# Patient Record
Sex: MALE | Race: BLACK OR AFRICAN AMERICAN | NOT HISPANIC OR LATINO | Employment: FULL TIME | URBAN - METROPOLITAN AREA
[De-identification: names, ages, dates, MRNs, and addresses within clinical notes are randomized per-mention and may not be internally consistent; named-entity substitution may affect disease eponyms.]

---

## 2023-10-31 ENCOUNTER — EVALUATION (OUTPATIENT)
Dept: PHYSICAL THERAPY | Facility: CLINIC | Age: 58
End: 2023-10-31
Payer: COMMERCIAL

## 2023-10-31 DIAGNOSIS — G89.29 CHRONIC LEFT SHOULDER PAIN: Primary | ICD-10-CM

## 2023-10-31 DIAGNOSIS — M25.512 CHRONIC LEFT SHOULDER PAIN: Primary | ICD-10-CM

## 2023-10-31 PROCEDURE — 97161 PT EVAL LOW COMPLEX 20 MIN: CPT | Mod: GP | Performed by: PHYSICAL THERAPIST

## 2023-10-31 PROCEDURE — 97110 THERAPEUTIC EXERCISES: CPT | Mod: GP | Performed by: PHYSICAL THERAPIST

## 2023-10-31 RX ORDER — ATORVASTATIN CALCIUM 80 MG/1
80 TABLET, FILM COATED ORAL DAILY
COMMUNITY
Start: 2023-07-19

## 2023-10-31 RX ORDER — MELOXICAM 7.5 MG/1
15 TABLET ORAL DAILY PRN
COMMUNITY
Start: 2023-07-05 | End: 2024-02-06 | Stop reason: ALTCHOICE

## 2023-10-31 RX ORDER — ATORVASTATIN CALCIUM 40 MG/1
40 TABLET, FILM COATED ORAL DAILY
COMMUNITY
Start: 2023-04-17 | End: 2024-02-06 | Stop reason: ALTCHOICE

## 2023-10-31 ASSESSMENT — ENCOUNTER SYMPTOMS
LOSS OF SENSATION IN FEET: 0
OCCASIONAL FEELINGS OF UNSTEADINESS: 0
DEPRESSION: 0

## 2023-10-31 NOTE — Clinical Note
October 31, 2023     Patient: Kevon Quijano   YOB: 1965   Date of Visit: 10/31/2023       To Whom it May Concern:    Kevon Quijano was seen in my clinic on 10/31/2023. He {Return to school/sport:01209}.    If you have any questions or concerns, please don't hesitate to call.         Sincerely,          Weston Dawson, PT        CC: No Recipients

## 2023-10-31 NOTE — PROGRESS NOTES
"Physical Therapy Evaluation    Patient Name: Kevon Quijano  MRN: [id number]  Today's Date: 10/31/2023  Visit: 1  Referred by: Dr. Cervantes  Diagnosis: L) shoulder pain    SUBJECTIVE:  58 y.o. English speaking male with c/o L) shoulder pain x 6 months.  Insidious onset of symptoms.  Symptoms anterior and superior aspects of L) shoulder.   Worse: lifting with arm extended away from body.  Moving arm above shoulder height or to extremes of motions.  Laying on that shoulder  Better: activity modification  Pain:  Superior and anterior L) shoulder pain 0-6/10.    Prior level of function:  No limitations    OBJECTIVE:  End range pain with ER and HBB-IR L)  Painful arc with L) shoulder ABD > flexion.  TTP over anterior and superior L) ACJ  (+) Carlos and Neer impingement  Painful and weak 4/5 resisted ER>ABD L) shoulder  4/5 mid and lower trap strength  (+) pain with ACJ shear test    Outcome Measure:  SPADI-  32%    ASSESSMENT:  Pt. 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  He has limited painful ROM, decreased painful resisted movements of shoulder which limit his functional abilities with the UE.  He requires PT to address these issues to improve his symptoms and functional tolerances.    TREATMENT:  - Therex:  Cross body S 10\" x 10  Prone scapular retract I's x 5\" x 10  Isometric ABD- 30\" x 3  Isometric ER- 30\" x 3  Doorway Pectoral stretch Low  15\" x 5    PATIENT EDUCATION:  HEP    PLAN:   Daily Hep 2x/day  Weekly PT x 8 weeks.  Pain is to improve pain free AROM and strength of shoulder to allow improved function.  Rehab potential: good  Plan of care agreement: Y    GOALS:  Active       PT Problem       Pt. will have improved score on SPADI by at least 15% indicative of improved function       Start:  10/31/23    Expected End:  12/29/23            Pt. will have improved pain free AROM of UE to assist with improving functional use of UE       Start:  10/31/23    Expected End:  12/29/23    "         Pt. will have improved UE strength to assist with improving functional use of UE       Start:  10/31/23    Expected End:  12/29/23            PT Goal 4       Start:  10/31/23    Expected End:  12/29/23       Pt. Will be independent with HEP         Pt. will be able to use UE with all ADLs       Start:  10/31/23    Expected End:  12/29/23            Pt. will report decreased pain by at least 2 levels using a VAS       Start:  10/31/23    Expected End:  12/29/23

## 2023-11-06 ENCOUNTER — TREATMENT (OUTPATIENT)
Dept: PHYSICAL THERAPY | Facility: CLINIC | Age: 58
End: 2023-11-06
Payer: COMMERCIAL

## 2023-11-06 DIAGNOSIS — G89.29 CHRONIC LEFT SHOULDER PAIN: Primary | ICD-10-CM

## 2023-11-06 DIAGNOSIS — M25.512 CHRONIC LEFT SHOULDER PAIN: Primary | ICD-10-CM

## 2023-11-06 PROCEDURE — 97110 THERAPEUTIC EXERCISES: CPT | Mod: GP,CQ | Performed by: PHYSICAL THERAPY ASSISTANT

## 2023-11-06 PROCEDURE — 97140 MANUAL THERAPY 1/> REGIONS: CPT | Mod: GP,CQ | Performed by: PHYSICAL THERAPY ASSISTANT

## 2023-11-06 NOTE — PROGRESS NOTES
"Physical Therapy Treatment    Patient Name: Kevon Quijano  MRN: [id number]  Today's Date: 11/6/2023  Visit# 2/4  10/31/23-12/29/23  Diagnosis:   1. Chronic left shoulder pain  Follow Up In Physical Therapy           PRECAUTIONS:      SUBJECTIVE:  Pt reports 4/10 L shld pain entering therapy today.     OBJECTIVE:  Intermittent pain with there ex, more specifically external rot alia. Modified the movement which voided symptoms.     Access Code: [email or web address]  URL: [email or web address]  Date: 11/06/2023  Prepared by: Reece Raphael    Exercises  - Standing Row with Anchored Resistance Band with PLB  - 1 x daily - 7 x weekly - 2-3 sets - 10 reps  - Shoulder extension with resistance - Neutral  - 1 x daily - 7 x weekly - 2-3 sets - 10 reps  - Sidelying Shoulder ER with Towel and Dumbbell  - 1 x daily - 7 x weekly - 2-3 sets - 10 reps  - Single Arm Serratus Punches in Supine with Dumbbell  - 1 x daily - 7 x weekly - 2-3 sets - 10 reps    TREATMENT:  - Therex:   UBE 2'/2'  Overhead pulley 3x10  Rows Blk band 2x10  Shld ext GTB 2x10  6-way shld alia 5\"-10\"x10ea  Supine ceiling punch 1# 2x10  S/L shld ER 1# 2x10      - Manual Therapy:   L shld PROM, x8'    - Neuromuscular Re-education:        - Gait Train:       - Modalities:           ASSESSMENT:   Overall responded well to Rx. Symptoms remains minimal but manageable with session. Added low impact band resisted exercises for HEP. Encouraged cont HEP.     PLAN:    Cont with progressive strengthen, mobility and function as saran.        "

## 2023-11-16 ENCOUNTER — TREATMENT (OUTPATIENT)
Dept: PHYSICAL THERAPY | Facility: CLINIC | Age: 58
End: 2023-11-16
Payer: COMMERCIAL

## 2023-11-16 DIAGNOSIS — G89.29 CHRONIC LEFT SHOULDER PAIN: ICD-10-CM

## 2023-11-16 DIAGNOSIS — M25.512 CHRONIC LEFT SHOULDER PAIN: ICD-10-CM

## 2023-11-16 PROCEDURE — 97110 THERAPEUTIC EXERCISES: CPT | Mod: GP | Performed by: PHYSICAL THERAPIST

## 2023-11-16 PROCEDURE — 97140 MANUAL THERAPY 1/> REGIONS: CPT | Mod: GP | Performed by: PHYSICAL THERAPIST

## 2023-11-16 NOTE — PROGRESS NOTES
"Physical Therapy Evaluation    Patient Name: Kevon Quijano  MRN: [id number]  Today's Date: 11/16/2023  Visit: 1  Referred by: Dr. Cervantes  Diagnosis: L) shoulder pain    SUBJECTIVE:  58 y.o. male who follows up for L) shoulder pain.     He states that his symptoms are improved.  Symptoms anterior and superior aspects of L) shoulder.   HEP: Y  Pain:  Superior and anterior L) shoulder pain 0-4/10.    Prior level of function:  No limitations    OBJECTIVE:  End range pain with ER and HBB-IR L)  End range pain with ABD and Flexion today.  Painful and weak 4/5 resisted ER>ABD L) shoulder  Minimal ache with Carlos impingement L) today.    Outcome Measure:  SPADI-  32%    ASSESSMENT:  Pt. With improved pain free ROM and reports improved pain free periods.  Tolerated today's session well.    TREATMENT:  - Therex:  UBE x 5 min. L5  Pulley elevation 2 x 15  Cross body S 10\" x 10  Doorway Pectoral stretch Low  30\" x 3  Prone scapular retract I's x 5\" x 10  1#  - Standing Row with Tband BLK  3 sets - 10 reps  - Shoulder extension 2#   3 sets - 10 reps  SL shoulder ABD  2#  3x10  - Sidelying Shoulder ER  2# 3 sets - 10 reps  - Supine Serratus Punches   2# 3 sets - 10 reps    Manual- inferior and posterior glide mobilization L) GHJ in 90* ABD    PATIENT EDUCATION:  HEP    PLAN:   Continue daily Hep   Continue to improving shoulder pain free mobility and cuff/scapular strength.  Rehab potential: good  Plan of care agreement: Y    GOALS:  Active       PT Problem       Pt. will have improved score on SPADI by at least 15% indicative of improved function       Start:  10/31/23    Expected End:  12/29/23            Pt. will have improved pain free AROM of UE to assist with improving functional use of UE       Start:  10/31/23    Expected End:  12/29/23            Pt. will have improved UE strength to assist with improving functional use of UE       Start:  10/31/23    Expected End:  12/29/23            PT Goal 4       Start:  " 10/31/23    Expected End:  12/29/23       Pt. Will be independent with HEP         Pt. will be able to use UE with all ADLs       Start:  10/31/23    Expected End:  12/29/23            Pt. will report decreased pain by at least 2 levels using a VAS       Start:  10/31/23    Expected End:  12/29/23

## 2023-12-05 ENCOUNTER — APPOINTMENT (OUTPATIENT)
Dept: RADIOLOGY | Facility: HOSPITAL | Age: 58
End: 2023-12-05
Payer: COMMERCIAL

## 2023-12-05 ENCOUNTER — HOSPITAL ENCOUNTER (EMERGENCY)
Facility: HOSPITAL | Age: 58
Discharge: HOME | End: 2023-12-05
Attending: EMERGENCY MEDICINE
Payer: COMMERCIAL

## 2023-12-05 VITALS
DIASTOLIC BLOOD PRESSURE: 78 MMHG | RESPIRATION RATE: 18 BRPM | SYSTOLIC BLOOD PRESSURE: 117 MMHG | OXYGEN SATURATION: 98 % | TEMPERATURE: 97.2 F | HEART RATE: 85 BPM

## 2023-12-05 DIAGNOSIS — M25.522 LEFT ELBOW PAIN: Primary | ICD-10-CM

## 2023-12-05 PROCEDURE — 99283 EMERGENCY DEPT VISIT LOW MDM: CPT | Mod: 25 | Performed by: EMERGENCY MEDICINE

## 2023-12-05 PROCEDURE — 73080 X-RAY EXAM OF ELBOW: CPT | Mod: LT,FY

## 2023-12-05 PROCEDURE — 73080 X-RAY EXAM OF ELBOW: CPT | Mod: LEFT SIDE | Performed by: RADIOLOGY

## 2023-12-05 RX ORDER — ACETAMINOPHEN 325 MG/1
975 TABLET ORAL ONCE
Status: DISCONTINUED | OUTPATIENT
Start: 2023-12-05 | End: 2023-12-05 | Stop reason: HOSPADM

## 2023-12-05 ASSESSMENT — COLUMBIA-SUICIDE SEVERITY RATING SCALE - C-SSRS
1. IN THE PAST MONTH, HAVE YOU WISHED YOU WERE DEAD OR WISHED YOU COULD GO TO SLEEP AND NOT WAKE UP?: NO
2. HAVE YOU ACTUALLY HAD ANY THOUGHTS OF KILLING YOURSELF?: NO
6. HAVE YOU EVER DONE ANYTHING, STARTED TO DO ANYTHING, OR PREPARED TO DO ANYTHING TO END YOUR LIFE?: NO

## 2023-12-05 NOTE — Clinical Note
Kevon Quijano was seen and treated in our emergency department on 12/5/2023.  He may return to work on 12/07/2023.       If you have any questions or concerns, please don't hesitate to call.      Duarte Stack PA-C

## 2023-12-05 NOTE — ED PROVIDER NOTES
HPI   Chief Complaint   Patient presents with    Elbow Pain       58-year-old male with past medical history of implantable cardiac defibrillator due to sinus bradycardia presents the ED with a chief concern of left elbow pain.  Patient states symptoms started this morning when he woke up.  States that he woke up this morning without any pain however when he looked in the mirror he noticed some swelling to his left elbow.  He has never noticed this before.  He states that once he noticed that he began to feel pain in the area.  Denies any fever/chills, nausea/vomiting.  Denies any numbness or weakness or tingling.  States he has chronic pain in the shoulder due to previous surgeries but denies any changes with this shoulder pain.  Denies any pain in the wrist.  He has never had similar symptoms in the past.  The right elbow is unremarkable.  He is an  and sits at a desk all day with his elbows on the desk.  No other symptoms or concerns at this time.  Patient denies any history of IV drug use.  He is not anticoagulated.      History provided by:  Patient   used: No                        [place] Coma Scale Score: 15                  Patient History   No past medical history on file.  No past surgical history on file.  No family history on file.  Social History     Tobacco Use    Smoking status: Not on file    Smokeless tobacco: Not on file   Substance Use Topics    Alcohol use: Not on file    Drug use: Not on file       Physical Exam   ED Triage Vitals [12/05/23 0807]   Temp Heart Rate Resp BP   36.2 °C (97.2 °F) 85 18 117/78      SpO2 Temp src Heart Rate Source Patient Position   98 % -- -- --      BP Location FiO2 (%)     -- --       Physical Exam  Constitutional: Vital signs per nursing notes.  Well developed, well nourished.  No acute distress.    Psychiatric: alert and oriented to person, place, and time; no abnormalities of mood or affect; memory intact  Eyes:  conjunctivae and  lids normal  ENT: Ears normal externally; face symmetric. voice normal  Neck: neck supple, no meningismus; trachea midline without deviation  Respiratory: normal respiratory effort and excursion; no rales, rhonchi, or wheezes; equal air entry  Cardiovascular: RRR, 2+ pulses extremities   Neurological: normal speech; CN II-XII grossly intact, normal motor and sensory function; no nystagmus; ; no ataxia.  GI: no distention, soft, nontender  : Deferred  Musculoskeletal: normal gait and station; normal digits and nails; swelling noted over left olecranon.  No induration or redness noted over left elbow.  Full range of motion of left elbow without any focal bony tenderness palpation.  Tenderness over the olecranon bursa.  Left shoulder and wrist are unremarkable.  2+ symmetric radial pulses bilaterally.  5/5 strength in upper extremities bilaterally.  Sensation intact in upper extremities bilaterally.  Neurovascular status intact.  Compartments are soft.  No cyanosis.    Skin: normal to inspection; normal to palpation; no rash    ED Course & MDM   ED Course as of 12/05/23 1049   Tue Dec 05, 2023   0920 IMPRESSION:  No acute fracture or dislocation.      Degenerative changes [MC]      ED Course User Index  [MC] Duarte Stack PA-C         Diagnoses as of 12/05/23 1049   Left elbow pain       Medical Decision Making  58-year-old male with past medical history of implantable cardiac defibrillator due to sinus bradycardia presents the ED with a chief concern of left elbow pain.  Vital signs are reassuring.  Patient overall appears well and is nontoxic-appearing.  Patient was given Tylenol in the ED. x-ray showed no acute fracture or dislocation.  Degenerative changes noted on left elbow.  The joint is not erythematous or warm.  There is no induration.  I am not concerned for any cellulitis, lymphangitis, septic arthritis, or crystal arthropathy at this time.  I do not think further testing with blood work or arthrocentesis  is indicated at this time.  Low suspicion for any radiculopathy at this time.  Elbow has notable greater trochanteric bursitis.  Consistent with a history as patient is an  and leans on his elbows as described.  He has no systemic signs or symptoms.  He is able to move the joint and is no pain with passive range of motion.  X-ray shows no fracture.  2+ symmetric radial pulses bilaterally.  No concern for any vascular pathology at this time.  No systemic signs or symptoms.  Patient is not immunocompromised.  Patient was placed in elbow Ace wrap in the ED.  Discussed my impression and findings with patient and he feels comfortable returning home.  We discussed very strict return precautions including returning for any new or worsening signs or symptoms.  Patient is in agreement this plan.  He will follow-up with orthopedics within 2 weeks.  Again discussed strict return precautions.    Differential diagnosis: Infected versus noninfected olecranon bursitis, gout, septic arthritis, crystal arthropathy, fracture, strain, sprain, ligamentous injury, cervical radiculopathy    Disposition/treatment  1.  Left elbow pain    Shared decision-making was used patient feels comfortable returning home     Patient was advised to follow up with recommended provider in 1 day1 for another evaluation and exam. I advised patient/guardian to return or go to closest emergency room immediately if symptoms change, get worse, new symptoms develop prior to follow up. If there is no improvement in symptoms in the next 24 hours they are advised to return for further evaluation and exam. I also explained the plan and treatment course. Patient/guardian is in agreement with plan, treatment course, and follow up and states verbally that they will comply.    Homegoing. I discussed the differential; results and discharge plan with the patient and/or family/friend/caregiver if present.  I emphasized the importance of follow-up with the  physician I referred them to in the timeframe recommended.  I explained reasons for the patient to return to the Emergency Department. They agreed that if they feel their condition is worsening or if they have any other concern they should call 911 immediately for further assistance. I gave the patient an opportunity to ask all questions they had and answered all of them accordingly. They understand return precautions and discharge instructions. The patient and/or family/friend/caregiver expressed understanding verbally and that they would comply.        This note has been transcribed using voice recognition and may contain grammatical errors, misplaced words, incorrect words, incorrect phrases or other errors.            Procedure  Procedures     Duarte Stack PA-C  12/05/23 1052

## 2023-12-28 ENCOUNTER — OFFICE VISIT (OUTPATIENT)
Dept: ORTHOPEDIC SURGERY | Facility: HOSPITAL | Age: 58
End: 2023-12-28
Payer: COMMERCIAL

## 2023-12-28 DIAGNOSIS — M70.22 OLECRANON BURSITIS OF LEFT ELBOW: Primary | ICD-10-CM

## 2023-12-28 PROCEDURE — 99213 OFFICE O/P EST LOW 20 MIN: CPT | Performed by: FAMILY MEDICINE

## 2023-12-28 PROCEDURE — 99203 OFFICE O/P NEW LOW 30 MIN: CPT | Performed by: FAMILY MEDICINE

## 2023-12-28 ASSESSMENT — PAIN SCALES - GENERAL: PAINLEVEL_OUTOF10: 0 - NO PAIN

## 2023-12-28 ASSESSMENT — PAIN - FUNCTIONAL ASSESSMENT: PAIN_FUNCTIONAL_ASSESSMENT: 0-10

## 2023-12-28 NOTE — PROGRESS NOTES
** Please excuse any errors in grammar or translation related to this dictation. Voice recognition software was utilized to prepare this document. **    Assessment & Plan:  Patient's elbow complaint most consistent with olecranon bursitis.  No concern for septic cause.  Condition has vastly improved since its onset with the use of compression sleeve and use of ibuprofen.  Discussed with patient that many times this condition is caused by repetitive microtrauma with his elbow rubbing on a hard surface.  Recommend purchasing cushion pad for when he is working at his desk to minimize this.  As the swelling is minimal at the present time advised against aspiration which he agrees with.  Did discuss with patient that this condition may recur and if it does at that time could consider aspiration +/- cortisone injection.  All questions answered and patient responded care.      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  Onset of this injury was on month ago.  No mechanism of injury. There is swelling.  It has decreased  since initial.  Following onset patient was evaluated at ER on 12/5/23, had xrays completed.  Since onset, symptoms have improved.  The injury is aggravated by bumping it.  He has been wearing a compression sleeve and taking motrin as needed.  Patient presents today for evaluation and further orthopedic management.  Denies previous surgery to this site.     Exam:  LEFT Elbow Exam:  Mild swelling over the olecranon process.  No erythema, ecchymosis or warmth.  Non-TTP over radial head, olecranon, medial epicondyle, lateral epicondyle  Flexion to 150 of 150deg, Extension to 0 of 0deg, Pronate/Supinate 80/80 of 80/80deg  5/5 strength elbow flexion and extension, wrist extension and flexion, finger spread  SILT    Results:  X-rays of left elbow obtained 12/5/2023 reviewed and independent  interpreted as no acute fracture.  Mild degenerative changes present.  Reviewed ER note dated 12/5/2023.

## 2024-01-26 DIAGNOSIS — M20.42 HAMMER TOE OF LEFT FOOT: Primary | ICD-10-CM

## 2024-01-26 RX ORDER — CEFAZOLIN SODIUM 2 G/100ML
2 INJECTION, SOLUTION INTRAVENOUS ONCE
Status: CANCELLED | OUTPATIENT
Start: 2024-03-01 | End: 2024-01-26

## 2024-01-26 RX ORDER — SODIUM CHLORIDE, SODIUM LACTATE, POTASSIUM CHLORIDE, CALCIUM CHLORIDE 600; 310; 30; 20 MG/100ML; MG/100ML; MG/100ML; MG/100ML
100 INJECTION, SOLUTION INTRAVENOUS CONTINUOUS
Status: CANCELLED | OUTPATIENT
Start: 2024-03-01

## 2024-02-06 ENCOUNTER — PRE-ADMISSION TESTING (OUTPATIENT)
Dept: PREADMISSION TESTING | Facility: HOSPITAL | Age: 59
End: 2024-02-06
Payer: COMMERCIAL

## 2024-02-06 VITALS
OXYGEN SATURATION: 96 % | DIASTOLIC BLOOD PRESSURE: 72 MMHG | WEIGHT: 240.08 LBS | BODY MASS INDEX: 30.81 KG/M2 | HEART RATE: 70 BPM | RESPIRATION RATE: 18 BRPM | SYSTOLIC BLOOD PRESSURE: 116 MMHG | TEMPERATURE: 96.4 F | HEIGHT: 74 IN

## 2024-02-06 DIAGNOSIS — M20.42 HAMMER TOE OF LEFT FOOT: ICD-10-CM

## 2024-02-06 LAB
ALBUMIN SERPL BCP-MCNC: 4.5 G/DL (ref 3.4–5)
ALP SERPL-CCNC: 108 U/L (ref 33–120)
ALT SERPL W P-5'-P-CCNC: 23 U/L (ref 10–52)
AMPHETAMINES UR QL SCN: ABNORMAL
ANION GAP SERPL CALC-SCNC: 11 MMOL/L (ref 10–20)
AST SERPL W P-5'-P-CCNC: 17 U/L (ref 9–39)
BARBITURATES UR QL SCN: ABNORMAL
BENZODIAZ UR QL SCN: ABNORMAL
BILIRUB SERPL-MCNC: 0.7 MG/DL (ref 0–1.2)
BUN SERPL-MCNC: 14 MG/DL (ref 6–23)
BZE UR QL SCN: ABNORMAL
CALCIUM SERPL-MCNC: 9.6 MG/DL (ref 8.6–10.3)
CANNABINOIDS UR QL SCN: ABNORMAL
CHLORIDE SERPL-SCNC: 108 MMOL/L (ref 98–107)
CO2 SERPL-SCNC: 25 MMOL/L (ref 21–32)
CREAT SERPL-MCNC: 1.25 MG/DL (ref 0.5–1.3)
EGFRCR SERPLBLD CKD-EPI 2021: 67 ML/MIN/1.73M*2
ERYTHROCYTE [DISTWIDTH] IN BLOOD BY AUTOMATED COUNT: 13.2 % (ref 11.5–14.5)
FENTANYL+NORFENTANYL UR QL SCN: ABNORMAL
GLUCOSE SERPL-MCNC: 108 MG/DL (ref 74–99)
HCT VFR BLD AUTO: 44.3 % (ref 41–52)
HGB BLD-MCNC: 15.1 G/DL (ref 13.5–17.5)
MCH RBC QN AUTO: 30.5 PG (ref 26–34)
MCHC RBC AUTO-ENTMCNC: 34.1 G/DL (ref 32–36)
MCV RBC AUTO: 90 FL (ref 80–100)
NRBC BLD-RTO: 0 /100 WBCS (ref 0–0)
OPIATES UR QL SCN: ABNORMAL
OXYCODONE+OXYMORPHONE UR QL SCN: ABNORMAL
PCP UR QL SCN: ABNORMAL
PLATELET # BLD AUTO: 264 X10*3/UL (ref 150–450)
POTASSIUM SERPL-SCNC: 4.2 MMOL/L (ref 3.5–5.3)
PROT SERPL-MCNC: 6.9 G/DL (ref 6.4–8.2)
RBC # BLD AUTO: 4.95 X10*6/UL (ref 4.5–5.9)
SODIUM SERPL-SCNC: 140 MMOL/L (ref 136–145)
WBC # BLD AUTO: 9.3 X10*3/UL (ref 4.4–11.3)

## 2024-02-06 PROCEDURE — 84075 ASSAY ALKALINE PHOSPHATASE: CPT

## 2024-02-06 PROCEDURE — 80307 DRUG TEST PRSMV CHEM ANLYZR: CPT

## 2024-02-06 PROCEDURE — 99203 OFFICE O/P NEW LOW 30 MIN: CPT | Performed by: NURSE PRACTITIONER

## 2024-02-06 PROCEDURE — 85027 COMPLETE CBC AUTOMATED: CPT

## 2024-02-06 PROCEDURE — 93005 ELECTROCARDIOGRAM TRACING: CPT

## 2024-02-06 PROCEDURE — 36415 COLL VENOUS BLD VENIPUNCTURE: CPT

## 2024-02-06 ASSESSMENT — DUKE ACTIVITY SCORE INDEX (DASI)
DASI METS SCORE: 9.9
CAN YOU TAKE CARE OF YOURSELF (EAT, DRESS, BATHE, OR USE TOILET): YES
CAN YOU CLIMB A FLIGHT OF STAIRS OR WALK UP A HILL: YES
CAN YOU DO LIGHT WORK AROUND THE HOUSE LIKE DUSTING OR WASHING DISHES: YES
CAN YOU DO MODERATE WORK AROUND THE HOUSE LIKE VACUUMING, SWEEPING FLOORS OR CARRYING GROCERIES: YES
CAN YOU DO YARD WORK LIKE RAKING LEAVES, WEEDING OR PUSHING A MOWER: YES
CAN YOU HAVE SEXUAL RELATIONS: YES
TOTAL_SCORE: 58.2
CAN YOU PARTICIPATE IN MODERATE RECREATIONAL ACTIVITIES LIKE GOLF, BOWLING, DANCING, DOUBLES TENNIS OR THROWING A BASEBALL OR FOOTBALL: YES
CAN YOU PARTICIPATE IN STRENOUS SPORTS LIKE SWIMMING, SINGLES TENNIS, FOOTBALL, BASKETBALL, OR SKIING: YES
CAN YOU WALK A BLOCK OR TWO ON LEVEL GROUND: YES
CAN YOU RUN A SHORT DISTANCE: YES
CAN YOU WALK INDOORS, SUCH AS AROUND YOUR HOUSE: YES
CAN YOU DO HEAVY WORK AROUND THE HOUSE LIKE SCRUBBING FLOORS OR LIFTING AND MOVING HEAVY FURNITURE: YES

## 2024-02-06 NOTE — PREPROCEDURE INSTRUCTIONS
Medication List            Accurate as of February 6, 2024  9:52 AM. Always use your most recent med list.                atorvastatin 80 mg tablet  Commonly known as: Lipitor  Medication Adjustments for Surgery: Take morning of surgery with sip of water, no other fluids                   PRE-OPERATIVE INSTRUCTIONS FOR SURGERY    *Do not eat anything after midnight the night of surgery.  This includes food of any kind (including hard candy, cough drops, mints and gum) and beverages (including coffee and soda).  You may drink up to one 8 ounce glass of water up until three hours before your scheduled surgery time.    *One of our staff members will call you ONE business day before your surgery, between 11a-2p  To let you know the time to arrive.  If you have no received a call by 2 pm, call [phone]  *When you arrive at the hospital-->GO TO Registration on the ground floor  *Stop smoking 24 hours prior to surgery.  No Marijuana, CBD Oil or Vaping for 48 hours  *No alcohol 24 hours prior to surgery  *You will need a responsible adult to drive you home  -No acrylic nails or nail polish on at least one fingernail, NO polish on toes for foot surgery  -You may be asked to remove your dentures, partial plate, eyeglasses or contact lenses before going to surgery.  Please bring a case for these items.  -Body piercings need to be removed.  Jewelry and valuables should be left at home.  -Put on loose,  comfortable, clean clothing, that will accommodate bandages    HOME PREOPERATIVE ANTIBACTERIAL SHOWER:  -This shower is a way of cleaning the skin with germ killing solution before surgery.  The solution contains Chorhexidine (CHG).   Let your Dr. Know if you are allergic to Chlorhexidine.    NIGHT BEFORE SURGERY:      -Take a normal shower and wash your hair  -Turn OFF water to avoid rinsing the antibacterial skin cleanser off (CHG).  -Apply the CHG cleanser to a clean and wet washcloth.  Lather your entire body from the  neck down.    -DO NOT USE ON THE HEAD, FACE, or GENITALS.  -RINSE immediately if CHG is in contact with your eyes, ears or mouth  -Gently wash your body.  Have the CHG cleanser stay on your skin for 3 MINUTES.  -After 3 minutes, turn on water and rinse the CHG cleanser off your body completely  -DO NOT WASH with regular soap after using CHG.  -PAT DRY with a clean fresh towel  -DO NOT apply any omalley, deodorants or lotions  -Dress in clean night cloths  **CHG cleanser will cause stains to fabrics if you wash them with bleach after use.     DAY OF SURGERY:  -Take shower-->JUST GET WET.  Turn OFF water.  -REPEAT the CHG cleanse as you did the night before.  -PAT DRY-->      Do not eat any food after midnight the night before your surgery/procedure.    Additional Instructions:     Day of Surgery:  Wear  comfortable loose fitting clothing  Do not use moisturizers, creams, lotions or perfume  All jewelry and valuables should be left at home

## 2024-02-06 NOTE — H&P (VIEW-ONLY)
"CPM/PAT Evaluation       Name: Kevon Quijano (Kevon Quijano \"Danny\")  /Age: 1965/58 y.o.     In-Person       Chief Complaint:     58  yr old male with c/o foot pain.    He c/o pain to this left foot, 4th and 5th toe  He states his \"little toe is pushing the 4th toe, causing a corn and deep fissure. \"    He notes he had a lump / bump 2 years ago--presented to a podiatrist  He shares that she scraped in between the toes and it got better. She prescribed him either antibiotic.    Few weeks ago he noticed it came back, and pain has gotten worse to the point of \"not able to wear shoes.\"   He denies any precipitating events or injury.  He c/o pain to lateral aspect of left foot, \"feeling like a vice is squeezing his foot\"   He c/o radiation around his foot, that comes and goes.    Pain worsens with walking and use of shoes. Causing him to walk with limp.   Pain interferes with sleep.    He has tried a \"scraping of the tissue,\"  last done 2 weeks ago which helped.   He elevates foot at night.   He states he gets a pedicure monthly and states that helps pain.     Denies N/T, drainage or skin breakdown.    He rates pain 2 today.   He treats pain with Tylenol, and taking dry skin off.     Denies any cardiac or respiratory symptoms.  Denies any past issues with anesthesia.    LEFT 5TH TOE DEROTATIONAL ARTHROPLASTY/ LEFT 4TH TOE CONDYLECTOMY is Scheduled on  3/1/2024 with Dr. Clark     Vitals:    24 0939   BP: 116/72   Pulse: 70   Resp: 18   Temp: 35.8 °C (96.4 °F)   SpO2: 96%          Past Medical History:   Diagnosis Date    Arrhythmia     Arthritis     Clotting disorder (CMS/HCC)     Hyperlipidemia     Irregular heart beat     Vision loss        Past Surgical History:   Procedure Laterality Date    CARDIAC DEFIBRILLATOR PLACEMENT      HERNIA REPAIR      ORCHIECTOMY      PACEMAKER PLACEMENT         Patient Sexual activity questions deferred to the physician.    Family History   Problem " Relation Name Age of Onset    Breast cancer Mother      Atrial fibrillation Father      Stroke Father      Blood clot Father      Atrial fibrillation Sister      Depression Other son        No Known Allergies    Prior to Admission medications    Medication Sig Start Date End Date Taking? Authorizing Provider   atorvastatin (Lipitor) 40 mg tablet Take 1 tablet (40 mg) by mouth once daily. 4/17/23   Historical Provider, MD   atorvastatin (Lipitor) 80 mg tablet Take 1 tablet (80 mg) by mouth once daily. 7/19/23   Historical Provider, MD   meloxicam (Mobic) 7.5 mg tablet Take 2 tablets (15 mg) by mouth once daily as needed. 7/5/23   Historical Provider, MD          Review of Systems  Constitutional: NO F, chills, or sweats  Eyes: no blurred vision or visual disturbance,  glasses  ENT: denies congestion, sore throat, difficulty hearing  Cardiovascular: no chest pain, no edema, no palps and no syncope.   Respiratory: + dry chronic cough, no s.o.b. and no wheezing  Gastrointestinal: no abdominal pain, no N/V, no blood in stools  Genitourinary: no dysuria, no urinary frequency, no urinary hesitancy and no feelings of urinary urgency.   Musculoskeletal: no arthralgias,  no back pain and no myalgias.   Integumentary: no new skin lesions and no rashes.   Neurological:  no headache, no limb weakness, no numbness and no tingling.   Endocrine: no recent weight gain and no recent weight loss.   Hematologic/Lymphatic: no tendency for easy bruising and no swollen glands.      Physical Exam  Constitutional:       Appearance: Normal appearance.   Cardiovascular:      Rate and Rhythm: Normal rate.      Heart sounds: Normal heart sounds.   Pulmonary:      Effort: Pulmonary effort is normal.      Breath sounds: Normal breath sounds.   Abdominal:      General: Bowel sounds are normal.      Palpations: Abdomen is soft.   Musculoskeletal:         General: Normal range of motion.      Cervical back: Normal range of motion.      Right lower  leg: No edema.      Left lower leg: No edema.   Skin:     General: Skin is warm and dry.   Neurological:      Mental Status: He is alert and oriented to person, place, and time.          PAT AIRWAY:   Airway:     Mallampati::  II    TM distance::  <3 FB    Neck ROM::  Full      Visit Vitals  /72   Pulse 70   Temp 35.8 °C (96.4 °F) (Temporal)   Resp 18       DASI Risk Score      Flowsheet Row Most Recent Value   DASI SCORE 58.2   METS Score (Will be calculated only when all the questions are answered) 9.9          Caprini DVT Assessment    No data to display       Modified Frailty Index    No data to display       CHADS2 Stroke Risk         N/A 3 - 100%: High Risk   2 - 3%: Medium Risk   0 - 2%: Low Risk     Last Change: N/A          This score determines the patient's risk of having a stroke if the patient has atrial fibrillation.        This score is not applicable to this patient. Components are not calculated.          Revised Cardiac Risk Index    No data to display       Apfel Simplified Score    No data to display       Risk Analysis Index Results This Encounter    No data found in the last 1 encounters.       Stop Bang Score      Flowsheet Row Most Recent Value   Do you snore loudly? 0   Do you often feel tired or fatigued after your sleep? 0   Has anyone ever observed you stop breathing in your sleep? 0   Do you have or are you being treated for high blood pressure? 0   Recent BMI (Calculated) 0   Age older than 50 years old? 1=Yes   Is your neck circumference greater than 17 inches (Male) or 16 inches (Female)? 1   Gender - Male 1=Yes            Assessment and Plan:     Cardiovascular:    PPM interrogation= 11/8/2023:  PRESENTS FOR: 6 month in clinic evaluation and OPD with Dr. Boles    PRESENTING EGM: AS. Programmed AAI 45    UNDERLYING RHYTHM: SR with 1st degree AVB    BATTERY STATUS: Estimated time remaining to JETHRO is 8.5- 10 years.    COUNTERS SINCE 5/8/23:    ATRIAL ARRHYTHMIAS: There were 0  triggered episodes of atrial high rates     VENTRICULAR ARRHYTHMIAS: no ventricular diagnostics.    LEAD MEASUREMENTS: sensing is appropriate. Capture is appropriate at 1.0V @ 0.5ms . The RV capture theshold was 1.0V @ 1.0ms. The atrial pacing output maintains safety margin. Review of the lead impedance trend is normal.     IMPLANT SITE/ SYMPTOMS: The incision and pocket are pain-free (0/10), well healed and without signs of erosion or infection. No arm swelling, syncope, pre-syncope or device related pocket stimulation.    OTHER DIAGNOSTICS: Total A pacing <1%    PROGRAMMING CHANGES MADE TODAY: none

## 2024-02-10 LAB
ATRIAL RATE: 66 BPM
P AXIS: 74 DEGREES
P OFFSET: 163 MS
P ONSET: 109 MS
PR INTERVAL: 230 MS
Q ONSET: 224 MS
QRS COUNT: 10 BEATS
QRS DURATION: 80 MS
QT INTERVAL: 394 MS
QTC CALCULATION(BAZETT): 413 MS
QTC FREDERICIA: 407 MS
R AXIS: 33 DEGREES
T AXIS: 43 DEGREES
T OFFSET: 421 MS
VENTRICULAR RATE: 66 BPM

## 2024-03-01 ENCOUNTER — ANESTHESIA (OUTPATIENT)
Dept: OPERATING ROOM | Facility: HOSPITAL | Age: 59
End: 2024-03-01
Payer: COMMERCIAL

## 2024-03-01 ENCOUNTER — ANESTHESIA EVENT (OUTPATIENT)
Dept: OPERATING ROOM | Facility: HOSPITAL | Age: 59
End: 2024-03-01
Payer: COMMERCIAL

## 2024-03-01 ENCOUNTER — HOSPITAL ENCOUNTER (OUTPATIENT)
Facility: HOSPITAL | Age: 59
Setting detail: OUTPATIENT SURGERY
Discharge: HOME | End: 2024-03-01
Attending: PODIATRIST | Admitting: PODIATRIST
Payer: COMMERCIAL

## 2024-03-01 VITALS
OXYGEN SATURATION: 96 % | SYSTOLIC BLOOD PRESSURE: 118 MMHG | HEART RATE: 59 BPM | TEMPERATURE: 97.3 F | DIASTOLIC BLOOD PRESSURE: 71 MMHG | RESPIRATION RATE: 21 BRPM

## 2024-03-01 DIAGNOSIS — M25.512 CHRONIC LEFT SHOULDER PAIN: ICD-10-CM

## 2024-03-01 DIAGNOSIS — Z95.0 PACEMAKER: Primary | ICD-10-CM

## 2024-03-01 DIAGNOSIS — M20.42 HAMMER TOE OF LEFT FOOT: Primary | ICD-10-CM

## 2024-03-01 DIAGNOSIS — G89.29 CHRONIC LEFT SHOULDER PAIN: ICD-10-CM

## 2024-03-01 DIAGNOSIS — M20.42 HAMMER TOE OF LEFT FOOT: ICD-10-CM

## 2024-03-01 PROBLEM — E78.5 HYPERLIPIDEMIA: Status: ACTIVE | Noted: 2024-03-01

## 2024-03-01 PROCEDURE — 3700000002 HC GENERAL ANESTHESIA TIME - EACH INCREMENTAL 1 MINUTE: Performed by: PODIATRIST

## 2024-03-01 PROCEDURE — 7100000001 HC RECOVERY ROOM TIME - INITIAL BASE CHARGE: Performed by: PODIATRIST

## 2024-03-01 PROCEDURE — 3600000003 HC OR TIME - INITIAL BASE CHARGE - PROCEDURE LEVEL THREE: Performed by: PODIATRIST

## 2024-03-01 PROCEDURE — 2500000004 HC RX 250 GENERAL PHARMACY W/ HCPCS (ALT 636 FOR OP/ED): Performed by: PODIATRIST

## 2024-03-01 PROCEDURE — 3600000008 HC OR TIME - EACH INCREMENTAL 1 MINUTE - PROCEDURE LEVEL THREE: Performed by: PODIATRIST

## 2024-03-01 PROCEDURE — 7100000002 HC RECOVERY ROOM TIME - EACH INCREMENTAL 1 MINUTE: Performed by: PODIATRIST

## 2024-03-01 PROCEDURE — 3700000001 HC GENERAL ANESTHESIA TIME - INITIAL BASE CHARGE: Performed by: PODIATRIST

## 2024-03-01 PROCEDURE — 7100000010 HC PHASE TWO TIME - EACH INCREMENTAL 1 MINUTE: Performed by: PODIATRIST

## 2024-03-01 PROCEDURE — 7100000009 HC PHASE TWO TIME - INITIAL BASE CHARGE: Performed by: PODIATRIST

## 2024-03-01 PROCEDURE — 2720000007 HC OR 272 NO HCPCS: Performed by: PODIATRIST

## 2024-03-01 PROCEDURE — 2500000005 HC RX 250 GENERAL PHARMACY W/O HCPCS: Performed by: NURSE ANESTHETIST, CERTIFIED REGISTERED

## 2024-03-01 PROCEDURE — A4217 STERILE WATER/SALINE, 500 ML: HCPCS | Performed by: PODIATRIST

## 2024-03-01 PROCEDURE — 2500000005 HC RX 250 GENERAL PHARMACY W/O HCPCS: Performed by: PODIATRIST

## 2024-03-01 PROCEDURE — 2500000004 HC RX 250 GENERAL PHARMACY W/ HCPCS (ALT 636 FOR OP/ED): Performed by: NURSE ANESTHETIST, CERTIFIED REGISTERED

## 2024-03-01 RX ORDER — SODIUM CHLORIDE 0.9 G/100ML
IRRIGANT IRRIGATION AS NEEDED
Status: DISCONTINUED | OUTPATIENT
Start: 2024-03-01 | End: 2024-03-01 | Stop reason: HOSPADM

## 2024-03-01 RX ORDER — SODIUM CHLORIDE, SODIUM LACTATE, POTASSIUM CHLORIDE, CALCIUM CHLORIDE 600; 310; 30; 20 MG/100ML; MG/100ML; MG/100ML; MG/100ML
100 INJECTION, SOLUTION INTRAVENOUS CONTINUOUS
Status: DISCONTINUED | OUTPATIENT
Start: 2024-03-01 | End: 2024-03-01 | Stop reason: HOSPADM

## 2024-03-01 RX ORDER — ALBUTEROL SULFATE 0.83 MG/ML
2.5 SOLUTION RESPIRATORY (INHALATION) ONCE AS NEEDED
Status: DISCONTINUED | OUTPATIENT
Start: 2024-03-01 | End: 2024-03-01 | Stop reason: HOSPADM

## 2024-03-01 RX ORDER — SCOLOPAMINE TRANSDERMAL SYSTEM 1 MG/1
1 PATCH, EXTENDED RELEASE TRANSDERMAL ONCE
Status: DISCONTINUED | OUTPATIENT
Start: 2024-03-01 | End: 2024-03-01 | Stop reason: HOSPADM

## 2024-03-01 RX ORDER — FENTANYL CITRATE 50 UG/ML
INJECTION, SOLUTION INTRAMUSCULAR; INTRAVENOUS AS NEEDED
Status: DISCONTINUED | OUTPATIENT
Start: 2024-03-01 | End: 2024-03-01

## 2024-03-01 RX ORDER — ONDANSETRON HYDROCHLORIDE 2 MG/ML
4 INJECTION, SOLUTION INTRAVENOUS ONCE AS NEEDED
Status: DISCONTINUED | OUTPATIENT
Start: 2024-03-01 | End: 2024-03-01 | Stop reason: HOSPADM

## 2024-03-01 RX ORDER — LIDOCAINE HYDROCHLORIDE 20 MG/ML
INJECTION, SOLUTION EPIDURAL; INFILTRATION; INTRACAUDAL; PERINEURAL AS NEEDED
Status: DISCONTINUED | OUTPATIENT
Start: 2024-03-01 | End: 2024-03-01

## 2024-03-01 RX ORDER — ONDANSETRON HYDROCHLORIDE 2 MG/ML
INJECTION, SOLUTION INTRAVENOUS AS NEEDED
Status: DISCONTINUED | OUTPATIENT
Start: 2024-03-01 | End: 2024-03-01

## 2024-03-01 RX ORDER — BUPIVACAINE HYDROCHLORIDE 5 MG/ML
INJECTION, SOLUTION PERINEURAL AS NEEDED
Status: DISCONTINUED | OUTPATIENT
Start: 2024-03-01 | End: 2024-03-01 | Stop reason: HOSPADM

## 2024-03-01 RX ORDER — OXYCODONE AND ACETAMINOPHEN 5; 325 MG/1; MG/1
1 TABLET ORAL EVERY 8 HOURS PRN
Qty: 21 TABLET | Refills: 0 | Status: SHIPPED | OUTPATIENT
Start: 2024-03-01 | End: 2024-03-08

## 2024-03-01 RX ORDER — MORPHINE SULFATE 2 MG/ML
2 INJECTION, SOLUTION INTRAMUSCULAR; INTRAVENOUS EVERY 5 MIN PRN
Status: DISCONTINUED | OUTPATIENT
Start: 2024-03-01 | End: 2024-03-01 | Stop reason: HOSPADM

## 2024-03-01 RX ORDER — DIPHENHYDRAMINE HYDROCHLORIDE 50 MG/ML
25 INJECTION INTRAMUSCULAR; INTRAVENOUS ONCE AS NEEDED
Status: DISCONTINUED | OUTPATIENT
Start: 2024-03-01 | End: 2024-03-01 | Stop reason: HOSPADM

## 2024-03-01 RX ORDER — CEFAZOLIN SODIUM 2 G/100ML
2 INJECTION, SOLUTION INTRAVENOUS ONCE
Status: COMPLETED | OUTPATIENT
Start: 2024-03-01 | End: 2024-03-01

## 2024-03-01 RX ORDER — LABETALOL HYDROCHLORIDE 5 MG/ML
5 INJECTION, SOLUTION INTRAVENOUS ONCE AS NEEDED
Status: DISCONTINUED | OUTPATIENT
Start: 2024-03-01 | End: 2024-03-01 | Stop reason: HOSPADM

## 2024-03-01 RX ORDER — METOPROLOL TARTRATE 1 MG/ML
5 INJECTION, SOLUTION INTRAVENOUS ONCE
Status: DISCONTINUED | OUTPATIENT
Start: 2024-03-01 | End: 2024-03-01 | Stop reason: HOSPADM

## 2024-03-01 RX ORDER — ASPIRIN 81 MG
100 TABLET, DELAYED RELEASE (ENTERIC COATED) ORAL 2 TIMES DAILY
Qty: 10 TABLET | Refills: 0 | Status: SHIPPED | OUTPATIENT
Start: 2024-03-01 | End: 2024-03-06

## 2024-03-01 RX ORDER — MIDAZOLAM HYDROCHLORIDE 1 MG/ML
INJECTION, SOLUTION INTRAMUSCULAR; INTRAVENOUS AS NEEDED
Status: DISCONTINUED | OUTPATIENT
Start: 2024-03-01 | End: 2024-03-01

## 2024-03-01 RX ORDER — PHENYLEPHRINE HCL IN 0.9% NACL 1 MG/10 ML
SYRINGE (ML) INTRAVENOUS AS NEEDED
Status: DISCONTINUED | OUTPATIENT
Start: 2024-03-01 | End: 2024-03-01

## 2024-03-01 RX ORDER — DEXAMETHASONE SODIUM PHOSPHATE 4 MG/ML
INJECTION, SOLUTION INTRA-ARTICULAR; INTRALESIONAL; INTRAMUSCULAR; INTRAVENOUS; SOFT TISSUE AS NEEDED
Status: DISCONTINUED | OUTPATIENT
Start: 2024-03-01 | End: 2024-03-01

## 2024-03-01 RX ORDER — MIDAZOLAM HYDROCHLORIDE 1 MG/ML
1 INJECTION, SOLUTION INTRAMUSCULAR; INTRAVENOUS ONCE AS NEEDED
Status: DISCONTINUED | OUTPATIENT
Start: 2024-03-01 | End: 2024-03-01 | Stop reason: HOSPADM

## 2024-03-01 RX ORDER — FAMOTIDINE 10 MG/ML
20 INJECTION INTRAVENOUS ONCE
Status: DISCONTINUED | OUTPATIENT
Start: 2024-03-01 | End: 2024-03-01 | Stop reason: HOSPADM

## 2024-03-01 RX ORDER — MEPERIDINE HYDROCHLORIDE 25 MG/ML
12.5 INJECTION INTRAMUSCULAR; INTRAVENOUS; SUBCUTANEOUS EVERY 10 MIN PRN
Status: DISCONTINUED | OUTPATIENT
Start: 2024-03-01 | End: 2024-03-01 | Stop reason: HOSPADM

## 2024-03-01 RX ORDER — ACETAMINOPHEN 325 MG/1
975 TABLET ORAL ONCE
Status: DISCONTINUED | OUTPATIENT
Start: 2024-03-01 | End: 2024-03-01 | Stop reason: HOSPADM

## 2024-03-01 RX ORDER — HYDROMORPHONE HYDROCHLORIDE 1 MG/ML
1 INJECTION, SOLUTION INTRAMUSCULAR; INTRAVENOUS; SUBCUTANEOUS EVERY 5 MIN PRN
Status: DISCONTINUED | OUTPATIENT
Start: 2024-03-01 | End: 2024-03-01 | Stop reason: HOSPADM

## 2024-03-01 RX ORDER — ASCORBIC ACID 500 MG
500 TABLET ORAL 2 TIMES DAILY
Qty: 60 TABLET | Refills: 0 | Status: SHIPPED | OUTPATIENT
Start: 2024-03-01 | End: 2024-03-31

## 2024-03-01 RX ORDER — PROPOFOL 10 MG/ML
INJECTION, EMULSION INTRAVENOUS AS NEEDED
Status: DISCONTINUED | OUTPATIENT
Start: 2024-03-01 | End: 2024-03-01

## 2024-03-01 RX ORDER — HYDRALAZINE HYDROCHLORIDE 20 MG/ML
5 INJECTION INTRAMUSCULAR; INTRAVENOUS EVERY 30 MIN PRN
Status: DISCONTINUED | OUTPATIENT
Start: 2024-03-01 | End: 2024-03-01 | Stop reason: HOSPADM

## 2024-03-01 RX ADMIN — Medication 100 MCG: at 08:10

## 2024-03-01 RX ADMIN — SODIUM CHLORIDE, POTASSIUM CHLORIDE, SODIUM LACTATE AND CALCIUM CHLORIDE 100 ML/HR: 600; 310; 30; 20 INJECTION, SOLUTION INTRAVENOUS at 06:53

## 2024-03-01 RX ADMIN — ONDANSETRON 4 MG: 2 INJECTION INTRAMUSCULAR; INTRAVENOUS at 07:44

## 2024-03-01 RX ADMIN — MIDAZOLAM 2 MG: 1 INJECTION INTRAMUSCULAR; INTRAVENOUS at 07:30

## 2024-03-01 RX ADMIN — LIDOCAINE HYDROCHLORIDE 50 MG: 20 INJECTION, SOLUTION EPIDURAL; INFILTRATION; INTRACAUDAL; PERINEURAL at 07:35

## 2024-03-01 RX ADMIN — Medication 100 MCG: at 07:58

## 2024-03-01 RX ADMIN — DEXAMETHASONE SODIUM PHOSPHATE 4 MG: 4 INJECTION, SOLUTION INTRAMUSCULAR; INTRAVENOUS at 07:44

## 2024-03-01 RX ADMIN — FENTANYL CITRATE 50 MCG: 50 INJECTION, SOLUTION INTRAMUSCULAR; INTRAVENOUS at 07:35

## 2024-03-01 RX ADMIN — CEFAZOLIN SODIUM 2 G: 2 INJECTION, SOLUTION INTRAVENOUS at 07:41

## 2024-03-01 RX ADMIN — PROPOFOL 200 MG: 10 INJECTION, EMULSION INTRAVENOUS at 07:35

## 2024-03-01 SDOH — HEALTH STABILITY: MENTAL HEALTH: CURRENT SMOKER: 0

## 2024-03-01 ASSESSMENT — PAIN - FUNCTIONAL ASSESSMENT
PAIN_FUNCTIONAL_ASSESSMENT: 0-10

## 2024-03-01 ASSESSMENT — COLUMBIA-SUICIDE SEVERITY RATING SCALE - C-SSRS
1. IN THE PAST MONTH, HAVE YOU WISHED YOU WERE DEAD OR WISHED YOU COULD GO TO SLEEP AND NOT WAKE UP?: NO
6. HAVE YOU EVER DONE ANYTHING, STARTED TO DO ANYTHING, OR PREPARED TO DO ANYTHING TO END YOUR LIFE?: NO
2. HAVE YOU ACTUALLY HAD ANY THOUGHTS OF KILLING YOURSELF?: NO

## 2024-03-01 ASSESSMENT — PAIN SCALES - GENERAL
PAINLEVEL_OUTOF10: 0 - NO PAIN

## 2024-03-01 NOTE — OP NOTE
"LEFT 5th TOE DEROTATIONAL ARTHROPLASTY/ LEFT 4th TOE CONDYLECTOMY WITH MINI C-ARM (L) Operative Note     Date: 3/1/2024  OR Location: [facility]    Name: Kevon Quijano \"Pollo", : 1965, Age: 58 y.o., MRN: [id number], Sex: male    Diagnosis  Pre-op Diagnosis     * Hammer toe of left foot [M20.42] Post-op Diagnosis     * Hammer toe of left foot [M20.42]     Procedures  LEFT 5th TOE DEROTATIONAL ARTHROPLASTY/ LEFT 4th TOE CONDYLECTOMY WITH MINI C-ARM  08779 - [place] RESECTION CONDYLE DISTAL END PHALANX EACH TOE      Surgeons      * Duane J Ehredt - Primary    Resident/Fellow/Other Assistant:  Surgeon(s) and Role:     * Nigel Alexander DPM - Resident - Assisting Radha VALDEZ    Procedure Summary  Anesthesia: General  ASA: III  Anesthesia Staff: Anesthesiologist: Francisco Javier Farooq MD  CRNA: BLUE Casillas-CRNA  Estimated Blood Loss: 5mL  Intra-op Medications:   Administrations occurring from 0730 to 1000 on 24:   Medication Name Total Dose   BUPivacaine HCl (Marcaine) 0.5 % (5 mg/mL) injection 20 mL   sodium chloride 0.9 % irrigation solution 1,000 mL   ceFAZolin in dextrose (iso-os) (Ancef) IVPB 2 g 2 g              Anesthesia Record               Intraprocedure I/O Totals       None           Specimen: No specimens collected     Staff:   Circulator: Herminia Gooden RN  Relief Circulator: Edwina Arias RN  Scrub Person: Megan Ortiz         Drains and/or Catheters: * None in log *    Tourniquet Times:     Total Tourniquet Time Documented:  Calf (Left) - 18 minutes  Total: Calf (Left) - 18 minutes   225 mmHg.  Should be noted was deflated prior to wound closure and intraoperative hemostasis was achieved.    Implants:     Findings: Consistent with diagnosis.  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    Indications: Danny Quijano is an 58 y.o. male who is having surgery for Hammer toe of left foot [M20.42].  He " is well-known to me at the [place] [facility] for painful left fourth and fifth toe deformity and interdigital callosity.  Has failed multiple conservative therapies elected for surgical intervention.    The patient was seen in the preoperative area. The risks, benefits, complications, treatment options, non-operative alternatives, expected recovery and outcomes were discussed with the patient. The possibilities of reaction to medication, pulmonary aspiration, injury to surrounding structures, bleeding, recurrent infection, the need for additional procedures, failure to diagnose a condition, and creating a complication requiring transfusion or operation were discussed with the patient. The patient concurred with the proposed plan, giving informed consent.  The site of surgery was properly noted/marked if necessary per policy. The patient has been actively warmed in preoperative area. Preoperative antibiotics have been ordered and given within 1 hours of incision. Venous thrombosis prophylaxis have been ordered including unilateral sequential compression device    Procedure Details: Patient was brought the operating room placed operatively supine position left lower extremity scrubbed prepped draped you sterile fashion prophylactic antibiotics were administered.  Attention was first directed left fifth toe where an adductovarus position was noted as above.  I drew out a standard 3-1 elliptical derotational incision about the PIP joint.  This was taken down full-thickness and the skin wedge was excised.  The extensor tendon was transected and the PIP joint was fully exposed and a full subperiosteal dissection performed.  Using bone cutting forceps we then resected the head of the proximal phalanx of the left fifth toe.  This was noted to have adequate offloading of the interdigital callosity.  This was then confirmed with multiple radiographic images and the small wound was flushed with carcinosis normal  sterile saline.  The extensor tendon apparatus was reapproximated with 4-0 Vicryl in an over number fashion subcutaneous tissue was closed with 4-0 Vicryl in simple buried fashion.  I then directed my attention to the left fourth toe where under radiographic control identified the prominent condyle of the proximal phalanx.  A small stab incision was made over the lying the lateral condyle and blunt dissect performed down to the level of the periosteum which was fully reflected.  I then inserted a small 2.0 sidecutting bur and performed a full condylectomy of this area with the sidecutting bur.  Multiple radiographic images confirmed resection.  I then flushed out the wound with copious amounts normal sterile saline.  The skin was then closed in both the fourth and fifth toe with 4-0 Prolene in an over and over fashion.  The foot was then cleansed and patted dry bacitracin ointment and Adaptic with a dry sterile dressing Coban for edema management was placed to the left lower extremity.  Complications:  None; patient tolerated the procedure well.    Disposition: PACU - hemodynamically stable.  Condition: stable         Additional Details:     Attending Attestation: I was present and scrubbed for the entire procedure.    Duane J Ehredt  Phone Number: [phone]

## 2024-03-01 NOTE — ANESTHESIA PROCEDURE NOTES
Airway  Date/Time: 3/1/2024 7:38 AM  Urgency: elective    Airway not difficult    Staffing  Performed: CRNA   Authorized by: Francisco Javier Farooq MD    Performed by: BLUE Casillas-ELENITA  Patient location during procedure: OR    Indications and Patient Condition  Indications for airway management: anesthesia  Spontaneous Ventilation: absent  Sedation level: deep  Preoxygenated: yes  Patient position: sniffing  Mask difficulty assessment: 2 - vent by mask + OA or adjuvant +/- NMBA    Final Airway Details  Final airway type: supraglottic airway      Successful airway: oropharyngeal  Size 5     Number of attempts at approach: 1

## 2024-03-01 NOTE — ANESTHESIA PREPROCEDURE EVALUATION
"Patient: Kevon Quijano \"Danny\"    Procedure Information       Date/Time: 03/01/24 0730    Procedure: LEFT 5th TOE DEROTATIONAL ARTHROPLASTY/ LEFT 4th TOE CONDYLECTOMY WITH MINI C-ARM (Left) - Derotational Arthroplasty Left 5th toe and Condylectomy Left 4th toe    Location: [facility] 02 / [facility]    Surgeons: Duane J Ehredt, DPM            Relevant Problems   Anesthesia (within normal limits)   (-) PONV (postoperative nausea and vomiting)      Cardiovascular   (+) Hyperlipidemia   (+) Pacemaker       Clinical information reviewed:   Tobacco  Allergies    Med Hx  Surg Hx   Fam Hx  Soc Hx        NPO Detail:  NPO/Void Status  Date of Last Liquid: 03/01/24  Time of Last Liquid: 0500  Date of Last Solid: 02/29/24  Time of Last Solid: 2100         Physical Exam    Airway  Mallampati: II  TM distance: >3 FB  Neck ROM: full     Cardiovascular - normal exam  Rhythm: regular  Rate: normal     Dental    Pulmonary - normal exam     Abdominal            Anesthesia Plan    History of general anesthesia?: yes  History of complications of general anesthesia?: no    ASA 3     general     The patient is not a current smoker.  Education provided regarding risk of obstructive sleep apnea.  intravenous induction   Anesthetic plan and risks discussed with patient.    Plan discussed with CRNA, CAA and attending.      "

## 2024-03-01 NOTE — BRIEF OP NOTE
"Date: 3/1/2024  OR Location: [facility]    Name: Kevon Quijano \"Danny\", : 1965, Age: 58 y.o., MRN: [id number], Sex: male    Diagnosis  Pre-op Diagnosis     * Hammer toe of left foot [M20.42] Post-op Diagnosis     * Hammer toe of left foot [M20.42]     Procedures  LEFT 5th TOE DEROTATIONAL ARTHROPLASTY/ LEFT 4th TOE CONDYLECTOMY WITH MINI C-ARM  87640 - CT RESECTION CONDYLE DISTAL END PHALANX EACH TOE      Surgeons      * Duane J Ehredt - Primary    Resident/Fellow/Other Assistant:  Surgeon(s) and Role:     * Nigel Alexander DPM - Resident - Assisting and Radha MSIII    Procedure Summary  Anesthesia: General  ASA: III  Anesthesia Staff: Anesthesiologist: Francisco Javier Farooq MD  CRNA: BLUE Casillas-CRNA  Estimated Blood Loss: 5mL  Intra-op Medications:   Administrations occurring from 0730 to 1000 on 24:   Medication Name Total Dose   BUPivacaine HCl (Marcaine) 0.5 % (5 mg/mL) injection 20 mL   sodium chloride 0.9 % irrigation solution 1,000 mL   ceFAZolin in dextrose (iso-os) (Ancef) IVPB 2 g 2 g              Anesthesia Record               Intraprocedure I/O Totals       None           Specimen: No specimens collected     Staff:   Circulator: Herminia Gooden RN  Scrub Person: Megan Ortiz          Findings: Consistent with diagnosis.  Painful heloma molle left fourth interspace.  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    Complications:  None; patient tolerated the procedure well.     Disposition: PACU - hemodynamically stable.  Condition: stable  Specimens Collected: No specimens collected  Attending Attestation: I was present and scrubbed for the entire procedure.    Duane J Ehredt  Phone Number: [phone]  "

## 2024-03-01 NOTE — ANESTHESIA POSTPROCEDURE EVALUATION
"Patient: Kevon Quijano \"Danny\"    Procedure Summary       Date: 03/01/24 Room / Location: [facility] / [facility]    Anesthesia Start: 0730 Anesthesia Stop: 0852    Procedure: LEFT 5th TOE DEROTATIONAL ARTHROPLASTY/ LEFT 4th TOE CONDYLECTOMY WITH MINI C-ARM (Left: Foot) Diagnosis:       Hammer toe of left foot      (Hammer toe of left foot [M20.42])    Surgeons: Duane J Ehredt, DPM Responsible Provider: Francisco Javier Farooq MD    Anesthesia Type: general ASA Status: 3            Anesthesia Type: general    Vitals Value Taken Time   /66 03/01/24 0952   Temp 36.1 °C (97 °F) 03/01/24 0850   Pulse 55 03/01/24 0956   Resp 28 03/01/24 0956   SpO2 94 % 03/01/24 0956   Vitals shown include unvalidated device data.    Anesthesia Post Evaluation    Patient location during evaluation: PACU  Patient participation: complete - patient participated  Level of consciousness: awake and alert  Pain management: satisfactory to patient  Airway patency: patent  Cardiovascular status: acceptable  Respiratory status: acceptable  Hydration status: acceptable  Postoperative Nausea and Vomiting: none  Comments: Patient presents in PACU with episode of \"dropping QRS\"  Patient has an dual lead pacemaker that is programmed AAI.  Patient is asymptomatic and HD stable.  I discussed the rhythm strip with Dr. Ramicone:  cardiology/EP  He reassured that the pacemaker is functioning and the Ventricles are not paced, thus you will see the P wave, but may not see a propogated QRS.  Patient did well in PACU and was discharged to home        No notable events documented.    "

## 2024-03-04 ASSESSMENT — PAIN SCALES - GENERAL: PAINLEVEL_OUTOF10: 0 - NO PAIN

## 2025-02-26 ENCOUNTER — OFFICE VISIT (OUTPATIENT)
Dept: URGENT CARE | Age: 60
End: 2025-02-26
Payer: COMMERCIAL

## 2025-02-26 VITALS
HEART RATE: 89 BPM | BODY MASS INDEX: 30.8 KG/M2 | TEMPERATURE: 99.5 F | HEIGHT: 74 IN | DIASTOLIC BLOOD PRESSURE: 86 MMHG | SYSTOLIC BLOOD PRESSURE: 128 MMHG | WEIGHT: 240 LBS | OXYGEN SATURATION: 97 % | RESPIRATION RATE: 16 BRPM

## 2025-02-26 DIAGNOSIS — R05.1 ACUTE COUGH: ICD-10-CM

## 2025-02-26 DIAGNOSIS — R52 BODY ACHES: ICD-10-CM

## 2025-02-26 DIAGNOSIS — J06.9 VIRAL URI: Primary | ICD-10-CM

## 2025-02-26 LAB
POC BINAX EXPIRATION: 0
POC BINAX NOW COVID SERIAL NUMBER: 0
POC RAPID INFLUENZA A: NEGATIVE
POC RAPID INFLUENZA B: NEGATIVE
POC SARS-COV-2 AG BINAX: NORMAL

## 2025-02-26 PROCEDURE — 87811 SARS-COV-2 COVID19 W/OPTIC: CPT | Performed by: NURSE PRACTITIONER

## 2025-02-26 PROCEDURE — 87804 INFLUENZA ASSAY W/OPTIC: CPT | Performed by: NURSE PRACTITIONER

## 2025-02-26 PROCEDURE — 99213 OFFICE O/P EST LOW 20 MIN: CPT | Performed by: NURSE PRACTITIONER

## 2025-02-26 PROCEDURE — 3008F BODY MASS INDEX DOCD: CPT | Performed by: NURSE PRACTITIONER

## 2025-02-26 PROCEDURE — 94640 AIRWAY INHALATION TREATMENT: CPT | Performed by: NURSE PRACTITIONER

## 2025-02-26 PROCEDURE — 1036F TOBACCO NON-USER: CPT | Performed by: NURSE PRACTITIONER

## 2025-02-26 RX ORDER — BENZONATATE 200 MG/1
200 CAPSULE ORAL 3 TIMES DAILY PRN
Qty: 20 CAPSULE | Refills: 0 | Status: SHIPPED | OUTPATIENT
Start: 2025-02-26 | End: 2025-03-05

## 2025-02-26 RX ORDER — ALBUTEROL SULFATE 90 UG/1
2 INHALANT RESPIRATORY (INHALATION) EVERY 6 HOURS PRN
Qty: 18 G | Refills: 0 | Status: SHIPPED | OUTPATIENT
Start: 2025-02-26 | End: 2026-02-26

## 2025-02-26 RX ORDER — IPRATROPIUM BROMIDE AND ALBUTEROL SULFATE 2.5; .5 MG/3ML; MG/3ML
3 SOLUTION RESPIRATORY (INHALATION) ONCE
Status: COMPLETED | OUTPATIENT
Start: 2025-02-26 | End: 2025-02-26

## 2025-02-26 RX ADMIN — IPRATROPIUM BROMIDE AND ALBUTEROL SULFATE 3 ML: 2.5; .5 SOLUTION RESPIRATORY (INHALATION) at 08:36

## 2025-02-26 ASSESSMENT — ENCOUNTER SYMPTOMS
RHINORRHEA: 1
FATIGUE: 1
PALPITATIONS: 0
COUGH: 1
ACTIVITY CHANGE: 1
DIARRHEA: 0
ABDOMINAL PAIN: 0
SINUS PAIN: 0
FEVER: 1
MYALGIAS: 1
NAUSEA: 0
SINUS PRESSURE: 0
SHORTNESS OF BREATH: 1
CHILLS: 0
HEADACHES: 0
SORE THROAT: 1
DIZZINESS: 0
VOMITING: 0
WEAKNESS: 0

## 2025-02-26 NOTE — PATIENT INSTRUCTIONS
Thank you for letting me care for you today.  You have been seen today for a viral upper respiratory tract infection.  Please follow up with your primary care provider/pediatrician as directed.   If one is needed, please call [phone].  Please seek care in emergency room for red flags as discussed during visit.

## 2025-02-26 NOTE — LETTER
February 26, 2025     Patient: Kevon Quijano   YOB: 1965   Date of Visit: 2/26/2025       To Whom It May Concern:    Kevon Quijano was seen in my clinic on 2/26/2025 at 8:00 am. Please excuse Kevon for his absence due to illness.  Patient may return to work when he is fever free for at least 24 hours without fever reducing medications.    If you have any questions or concerns, please don't hesitate to call.         Sincerely,         Kristin L Schoenlein, APRN-CNP        CC: No Recipients

## 2025-02-26 NOTE — PROGRESS NOTES
"Subjective   Patient ID: Kevon Quijano \"Pollo" is a 59 y.o. male. They present today with a chief complaint of Sore Throat and Generalized Body Aches (Pt states yesterday body aches, chills, headache and sore throat. Some drainage ).    History of Present Illness  This is a 59-year-old male who presents to the clinic today for evaluation of an acute onset sore throat, body aches, chills, headaches.  Patient states biggest complaint is his cough.  States about 2 weeks ago he had flulike symptoms that completely resolved.  Throat started bothering him on Sunday.  Patient states short of breath with cough and exertion.  No history of asthma.  Does not smoke.  Denies chest pain, shortness of breath, at rest wheezing, orthopnea, pedal edema.  Denies change in bowel or bladder function.  Denies GI complaints.      Sore Throat   Associated symptoms include coughing and shortness of breath. Pertinent negatives include no abdominal pain, congestion, diarrhea, ear pain, headaches or vomiting.       Past Medical History  Allergies as of 02/26/2025 - Reviewed 02/26/2025   Allergen Reaction Noted    Cephalexin GI Upset and Nausea/vomiting 08/25/2022       (Not in a hospital admission)       Past Medical History:   Diagnosis Date    Arrhythmia     Arthritis     Clotting disorder (Multi)     Hyperlipidemia     Irregular heart beat     Vision loss        Past Surgical History:   Procedure Laterality Date    CARDIAC DEFIBRILLATOR PLACEMENT      HERNIA REPAIR      ORCHIECTOMY      PACEMAKER PLACEMENT          reports that he has quit smoking. His smoking use included cigarettes. He has never used smokeless tobacco. He reports current drug use. Drug: Marijuana. He reports that he does not drink alcohol.    Review of Systems  Review of Systems   Constitutional:  Positive for activity change, fatigue and fever. Negative for chills.   HENT:  Positive for rhinorrhea and sore throat. Negative for congestion, ear pain, sinus " "pressure and sinus pain.    Eyes:  Negative for visual disturbance.   Respiratory:  Positive for cough and shortness of breath.    Cardiovascular:  Negative for chest pain, palpitations and leg swelling.   Gastrointestinal:  Negative for abdominal pain, diarrhea, nausea and vomiting.   Musculoskeletal:  Positive for myalgias.   Skin:  Negative for rash.   Neurological:  Negative for dizziness, weakness and headaches.   All other systems reviewed and are negative.                                 Objective    Vitals:    02/26/25 0808   BP: 128/86   Pulse: 89   Resp: 16   Temp: 37.5 °C (99.5 °F)   SpO2: 97%   Weight: 109 kg (240 lb)   Height: 1.88 m (6' 2\")     No LMP for male patient.    Physical Exam  Vitals reviewed.   Constitutional:       General: He is not in acute distress.     Appearance: Normal appearance. He is normal weight. He is ill-appearing. He is not toxic-appearing.   HENT:      Head: Normocephalic and atraumatic.      Right Ear: Tympanic membrane, ear canal and external ear normal.      Left Ear: Tympanic membrane, ear canal and external ear normal.      Nose: Congestion and rhinorrhea present.      Mouth/Throat:      Mouth: Mucous membranes are moist.      Pharynx: No oropharyngeal exudate or posterior oropharyngeal erythema.   Eyes:      Extraocular Movements: Extraocular movements intact.      Conjunctiva/sclera: Conjunctivae normal.      Pupils: Pupils are equal, round, and reactive to light.   Cardiovascular:      Rate and Rhythm: Normal rate and regular rhythm.      Pulses: Normal pulses.      Heart sounds: Normal heart sounds.   Pulmonary:      Effort: Pulmonary effort is normal.      Breath sounds: Decreased air movement present.   Abdominal:      General: Abdomen is flat. Bowel sounds are normal. There is no distension.      Palpations: Abdomen is soft.      Tenderness: There is no abdominal tenderness.   Musculoskeletal:         General: Normal range of motion.      Cervical back: Normal " range of motion and neck supple.   Skin:     General: Skin is warm and dry.      Capillary Refill: Capillary refill takes less than 2 seconds.   Neurological:      General: No focal deficit present.      Mental Status: He is alert and oriented to person, place, and time.   Psychiatric:         Mood and Affect: Mood normal.         Behavior: Behavior normal.         Procedures    Point of Care Test & Imaging Results from this visit  Results for orders placed or performed in visit on 02/26/25   POCT Covid-19 Rapid Antigen   Result Value Ref Range    Binax NOW Covid Serial Number 0     BINAX NOW Covid Expiration 0     POC MONIQUE-COV-2 AG  Presumptive negative test for SARS-CoV-2 (no antigen detected)     Presumptive negative test for SARS-CoV-2 (no antigen detected)   POCT Influenza A/B manually resulted   Result Value Ref Range    POC Rapid Influenza A Negative Negative    POC Rapid Influenza B Negative Negative      No results found.    Diagnostic study results (if any) were reviewed by Kristin L Schoenlein, APRN-CNP.    Assessment/Plan   Allergies, medications, history, and pertinent labs/EKGs/Imaging reviewed by Kristin L Schoenlein, APRN-CNP.     Medical Decision Making  VSS, NAD, patient acutely ill-appearing, however, nontoxic appearing.  Physical exam as documented above.  In office testing negative, no need for additional testing at this time.  DuoNeb administered.  Lung CT status post.  Patient reports improvement.    Symptoms, history, and exam are consistent with: Viral URI.  Will do trial of albuterol HFA and Tessalon Perles.  Encouraged PCP follow-up and low threshold for ED.      Differential Dx include, however, are not limited to:Bronchitis, pneumonia, reactive airway    I have a low suspicion for any acute pathologies requiring emergent evaluation and further workup at this time.  I believe patient is safe to discharge home with a low threshold for emergency room as discussed during visit.  We discussed  close follow-up with primary care provider/pediatrician. Supportive care discussed.  Medication(s) profile of OTC and Rxed medication(s) if prescribed was (were) reviewed.  All questions answered and addressed.  Patient verbalized understanding.      Orders and Diagnoses  Diagnoses and all orders for this visit:  Viral URI  -     albuterol 90 mcg/actuation inhaler; Inhale 2 puffs every 6 hours if needed for wheezing.  -     benzonatate (Tessalon) 200 mg capsule; Take 1 capsule (200 mg) by mouth 3 times a day as needed for cough for up to 7 days. Do not crush or chew.  Body aches  -     POCT Covid-19 Rapid Antigen  -     POCT Influenza A/B manually resulted  Acute cough  -     ipratropium-albuteroL (Duo-Neb) 0.5-2.5 mg/3 mL nebulizer solution 3 mL  -     albuterol 90 mcg/actuation inhaler; Inhale 2 puffs every 6 hours if needed for wheezing.  -     benzonatate (Tessalon) 200 mg capsule; Take 1 capsule (200 mg) by mouth 3 times a day as needed for cough for up to 7 days. Do not crush or chew.      Medical Admin Record  Administrations This Visit       ipratropium-albuteroL (Duo-Neb) 0.5-2.5 mg/3 mL nebulizer solution 3 mL       Admin Date  02/26/2025 Action  Given Dose  3 mL Route  nebulization Documented By  Freda Alejandre MA                    Patient disposition: Home    Electronically signed by Kristin L Schoenlein, APRN-TJ  9:26 AM

## (undated) DEVICE — Device

## (undated) DEVICE — BANDAGE, STRETCH, CONFORM, 3 IN X 4.1 YD, STERILE

## (undated) DEVICE — COVER, MAYO STAND, W/PAD, 23 IN, DISPOSABLE, PLASTIC, LF, STERILE

## (undated) DEVICE — BANDAGE, GAUZE, CONFORMING, KERLIX, 6 PLY, 4.5 IN X 4.1 YD

## (undated) DEVICE — DRAPE, SHEET, U, W/ADHESIVE STRIP, IMPERVIOUS, 60 X 70 IN, DISPOSABLE, LF, STERILE

## (undated) DEVICE — DRAPE KIT, MINI C-ARM

## (undated) DEVICE — BANDAGE, COFLEX, 6 X 5 YDS, FOAM TAN, STERILE, LF

## (undated) DEVICE — SYRINGE, 10 CC, LUER LOCK

## (undated) DEVICE — NEEDLE, HYPODERMIC, MONOJECT, 27 G X 1.5 IN

## (undated) DEVICE — STOCKINETTE, TUBULAR, DBL PLY, PRECUT, 6 X 60 IN, STERILE

## (undated) DEVICE — APPLICATOR, CHLORAPREP, W/ORANGE TINT, 26ML

## (undated) DEVICE — CUFF, TOURNIQUET 18 DUAL PORT/SNGL BLAD"

## (undated) DEVICE — STRIP, SKIN CLOSURE, STERI STRIP, REINFORCED, 0.5 X 4 IN

## (undated) DEVICE — DRESSING, GAUZE, SPONGE, 12 PLY, CURITY, 4 X 4 IN, RIGID TRAY, STERILE

## (undated) DEVICE — BANDAGE, ESMARK 4 IN X 9 FT, STERILE